# Patient Record
Sex: FEMALE | Race: WHITE | NOT HISPANIC OR LATINO | URBAN - METROPOLITAN AREA
[De-identification: names, ages, dates, MRNs, and addresses within clinical notes are randomized per-mention and may not be internally consistent; named-entity substitution may affect disease eponyms.]

---

## 2018-09-06 ENCOUNTER — IMPORTED ENCOUNTER (OUTPATIENT)
Dept: URBAN - METROPOLITAN AREA CLINIC 50 | Facility: CLINIC | Age: 73
End: 2018-09-06

## 2018-09-11 ENCOUNTER — IMPORTED ENCOUNTER (OUTPATIENT)
Dept: URBAN - METROPOLITAN AREA CLINIC 50 | Facility: CLINIC | Age: 73
End: 2018-09-11

## 2018-09-13 ENCOUNTER — IMPORTED ENCOUNTER (OUTPATIENT)
Dept: URBAN - METROPOLITAN AREA CLINIC 50 | Facility: CLINIC | Age: 73
End: 2018-09-13

## 2019-03-21 ENCOUNTER — IMPORTED ENCOUNTER (OUTPATIENT)
Dept: URBAN - METROPOLITAN AREA CLINIC 50 | Facility: CLINIC | Age: 74
End: 2019-03-21

## 2019-03-21 NOTE — PATIENT DISCUSSION
""" to review testing with patient today."" ""Based on increased c/d ratio and/or ocular hypertension

## 2019-09-24 ENCOUNTER — IMPORTED ENCOUNTER (OUTPATIENT)
Dept: URBAN - METROPOLITAN AREA CLINIC 50 | Facility: CLINIC | Age: 74
End: 2019-09-24

## 2020-03-31 ENCOUNTER — IMPORTED ENCOUNTER (OUTPATIENT)
Dept: URBAN - METROPOLITAN AREA CLINIC 50 | Facility: CLINIC | Age: 75
End: 2020-03-31

## 2020-09-21 ENCOUNTER — IMPORTED ENCOUNTER (OUTPATIENT)
Dept: URBAN - METROPOLITAN AREA CLINIC 50 | Facility: CLINIC | Age: 75
End: 2020-09-21

## 2020-09-23 ENCOUNTER — IMPORTED ENCOUNTER (OUTPATIENT)
Dept: URBAN - METROPOLITAN AREA CLINIC 50 | Facility: CLINIC | Age: 75
End: 2020-09-23

## 2021-03-24 ENCOUNTER — IMPORTED ENCOUNTER (OUTPATIENT)
Dept: URBAN - METROPOLITAN AREA CLINIC 50 | Facility: CLINIC | Age: 76
End: 2021-03-24

## 2021-04-17 ASSESSMENT — TONOMETRY
OD_IOP_MMHG: 18
OS_IOP_MMHG: 17
OS_IOP_MMHG: 18
OS_IOP_MMHG: 22
OS_IOP_MMHG: 21
OD_IOP_MMHG: 20
OS_IOP_MMHG: 20
OS_IOP_MMHG: 18
OS_IOP_MMHG: 22
OS_IOP_MMHG: 21
OD_IOP_MMHG: 20
OD_IOP_MMHG: 18
OD_IOP_MMHG: 18
OS_IOP_MMHG: 16
OS_IOP_MMHG: 23
OD_IOP_MMHG: 21
OD_IOP_MMHG: 19
OD_IOP_MMHG: 22
OS_IOP_MMHG: 20
OD_IOP_MMHG: 19
OS_IOP_MMHG: 18
OS_IOP_MMHG: 21
OD_IOP_MMHG: 20
OD_IOP_MMHG: 19
OD_IOP_MMHG: 20
OD_IOP_MMHG: 21
OS_IOP_MMHG: 19

## 2021-04-17 ASSESSMENT — VISUAL ACUITY
OS_PH: 20/30-
OS_PH: 20/30-
OS_CC: J1
OD_OTHER: 20/60. 20/100.
OD_PH: 20/100
OS_PH: 20/30-2
OS_SC: 20/30-2
OS_SC: 20/60-2
OS_CC: J1@ 16 IN
OD_SC: 20/400
OD_PH: 20/40
OS_SC: 20/40
OD_OTHER: 20/40. 20/50.
OD_PH: 20/100
OD_BAT: 20/40
OD_PH: 20/30-
OD_SC: 20/200
OS_CC: J1
OD_SC: 20/200
OS_BAT: 20/60
OD_SC: 20/200
OD_SC: 20/200
OD_OTHER: 20/60. 20/80.
OS_OTHER: 20/60. 20/400.
OD_CC: J1
OD_BAT: 20/60
OD_CC: J1
OD_SC: 20/400
OS_CC: 20/40
OS_PH: 20/40-
OD_BAT: 20/60
OS_SC: 20/40
OS_SC: 20/40-2
OD_CC: J1@ 16 IN
OD_PH: 20/60
OS_SC: 20/40

## 2021-04-17 ASSESSMENT — PACHYMETRY
OD_CT_UM: 590
OS_CT_UM: 610
OS_CT_UM: 610
OD_CT_UM: 590
OS_CT_UM: 610
OD_CT_UM: 590
OS_CT_UM: 610
OD_CT_UM: 590
OS_CT_UM: 610
OD_CT_UM: 590
OS_CT_UM: 610

## 2021-10-18 ENCOUNTER — PREPPED CHART (OUTPATIENT)
Dept: URBAN - METROPOLITAN AREA CLINIC 53 | Facility: CLINIC | Age: 76
End: 2021-10-18

## 2021-10-19 ENCOUNTER — DIAGNOSTICS - HVF/OCT (OUTPATIENT)
Dept: URBAN - METROPOLITAN AREA CLINIC 53 | Facility: CLINIC | Age: 76
End: 2021-10-19

## 2021-10-19 DIAGNOSIS — H40.013: ICD-10-CM

## 2021-10-19 PROCEDURE — 92083 EXTENDED VISUAL FIELD XM: CPT

## 2021-10-19 PROCEDURE — 92133 CPTRZD OPH DX IMG PST SGM ON: CPT

## 2021-10-27 ENCOUNTER — 6 MONTH COMPREHENSIVE EXAM (OUTPATIENT)
Dept: URBAN - METROPOLITAN AREA CLINIC 49 | Facility: CLINIC | Age: 76
End: 2021-10-27

## 2021-10-27 DIAGNOSIS — H40.1231: ICD-10-CM

## 2021-10-27 PROCEDURE — 92014 COMPRE OPH EXAM EST PT 1/>: CPT

## 2021-10-27 ASSESSMENT — TONOMETRY
OD_IOP_MMHG: 16
OS_IOP_MMHG: 12
OD_IOP_MMHG: 12
OS_IOP_MMHG: 16

## 2021-10-27 ASSESSMENT — VISUAL ACUITY
OD_SC: J1+
OD_PH: 20/50
OU_SC: 20/25
OD_SC: 20/200
OU_SC: J1+
OS_SC: 20/25-2
OD_GLARE: 20/25
OS_SC: J4
OS_GLARE: 20/25

## 2021-10-27 NOTE — PATIENT DISCUSSION
iop is good today,  but based on hvf from 10/19/2021 and optic nerve size it is recommended patient start brimonidine 0.2% twice a day in both eyes.  will recheck iop in 3 month.

## 2021-10-27 NOTE — PATIENT DISCUSSION
iop is good today,  but based on hvf from 10/19/2021 and optic nerve size it is recommended patient start brimonidine 0.2% twice a day in both eyes.  will recheck iop in 1 month.

## 2022-02-02 ENCOUNTER — ESTABLISHED PATIENT (OUTPATIENT)
Dept: URBAN - METROPOLITAN AREA CLINIC 49 | Facility: CLINIC | Age: 77
End: 2022-02-02

## 2022-02-02 DIAGNOSIS — H35.373: ICD-10-CM

## 2022-02-02 DIAGNOSIS — H43.813: ICD-10-CM

## 2022-02-02 DIAGNOSIS — H40.1231: ICD-10-CM

## 2022-02-02 PROCEDURE — 92014 COMPRE OPH EXAM EST PT 1/>: CPT

## 2022-02-02 PROCEDURE — 92134 CPTRZ OPH DX IMG PST SGM RTA: CPT

## 2022-02-02 RX ORDER — TIMOLOL MALEATE 6.8 MG/ML
1 SOLUTION OPHTHALMIC TWICE A DAY
Start: 2022-02-02

## 2022-02-02 ASSESSMENT — VISUAL ACUITY
OS_SC: 20/30
OS_SC: J3
OU_SC: J1
OU_SC: 20/30
OD_PH: 20/50
OS_PH: 20/25
OD_SC: 20/100
OD_SC: J1

## 2022-02-02 ASSESSMENT — TONOMETRY
OS_IOP_MMHG: 15
OS_IOP_MMHG: 19
OD_IOP_MMHG: 18
OD_IOP_MMHG: 14

## 2022-02-02 NOTE — PATIENT DISCUSSION
IOP stable but Brimonidine is making patient tired. Will make changes to drop therapy. Discontinue Brimonidine. Start Timolol OU BID. Follow up for IOP check in 1 month.

## 2022-03-02 ENCOUNTER — FOLLOW UP (OUTPATIENT)
Dept: URBAN - METROPOLITAN AREA CLINIC 49 | Facility: CLINIC | Age: 77
End: 2022-03-02

## 2022-03-02 DIAGNOSIS — H40.1231: ICD-10-CM

## 2022-03-02 PROCEDURE — 92012 INTRM OPH EXAM EST PATIENT: CPT

## 2022-03-02 ASSESSMENT — TONOMETRY
OD_IOP_MMHG: 14
OS_IOP_MMHG: 14
OD_IOP_MMHG: 18
OS_IOP_MMHG: 18

## 2022-03-02 ASSESSMENT — VISUAL ACUITY
OS_SC: 20/30-1
OD_PH: 20/70
OD_SC: 20/200

## 2022-03-02 NOTE — PATIENT DISCUSSION
IOP stable OU with Timolol use. Continue current Timolol drop therapy. Follow up in 6 months for IOP check.

## 2022-09-01 ENCOUNTER — FOLLOW UP (OUTPATIENT)
Dept: URBAN - METROPOLITAN AREA CLINIC 49 | Facility: CLINIC | Age: 77
End: 2022-09-01

## 2022-09-01 DIAGNOSIS — H40.1231: ICD-10-CM

## 2022-09-01 PROCEDURE — 92012 INTRM OPH EXAM EST PATIENT: CPT

## 2022-09-01 ASSESSMENT — VISUAL ACUITY
OD_SC: 20/200
OD_PH: 20/70
OS_SC: 20/40-1
OS_PH: 20/30-

## 2022-09-01 ASSESSMENT — TONOMETRY
OS_IOP_MMHG: 18
OD_IOP_MMHG: 14
OS_IOP_MMHG: 14
OD_IOP_MMHG: 18